# Patient Record
Sex: MALE | Race: WHITE | NOT HISPANIC OR LATINO | Employment: FULL TIME | URBAN - METROPOLITAN AREA
[De-identification: names, ages, dates, MRNs, and addresses within clinical notes are randomized per-mention and may not be internally consistent; named-entity substitution may affect disease eponyms.]

---

## 2018-02-22 ENCOUNTER — OFFICE VISIT (OUTPATIENT)
Dept: ORTHOPEDIC SURGERY | Facility: CLINIC | Age: 28
End: 2018-02-22

## 2018-02-22 VITALS
HEIGHT: 70 IN | WEIGHT: 229.28 LBS | DIASTOLIC BLOOD PRESSURE: 92 MMHG | HEART RATE: 81 BPM | BODY MASS INDEX: 32.82 KG/M2 | SYSTOLIC BLOOD PRESSURE: 168 MMHG
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
## 2018-02-22 DIAGNOSIS — M25.551 RIGHT HIP PAIN: ICD-10-CM
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
## 2018-02-22 DIAGNOSIS — R52 PAIN: Primary | ICD-10-CM

## 2018-02-22 PROCEDURE — 99203 OFFICE O/P NEW LOW 30 MIN: CPT | Performed by: ORTHOPAEDIC SURGERY

## 2018-02-22 RX ORDER — PANTOPRAZOLE SODIUM 40 MG/1
40 TABLET, DELAYED RELEASE ORAL DAILY
COMMUNITY
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
## 2018-03-14 ENCOUNTER — HOSPITAL ENCOUNTER (OUTPATIENT)
Dept: GENERAL RADIOLOGY | Facility: HOSPITAL | Age: 28
Discharge: HOME | End: 2018-03-14
Attending: ORTHOPAEDIC SURGERY | Admitting: ORTHOPAEDIC SURGERY

## 2018-03-14 ENCOUNTER — HOSPITAL ENCOUNTER (OUTPATIENT)
Dept: MRI IMAGING | Facility: HOSPITAL | Age: 28
Discharge: HOME | End: 2018-03-14
Attending: ORTHOPAEDIC SURGERY

## 2018-03-14 DIAGNOSIS — M25.551 RIGHT HIP PAIN: ICD-10-CM

## 2018-03-14 DIAGNOSIS — R52 PAIN: ICD-10-CM
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
## 2018-03-14 PROCEDURE — 0 GADOBENATE DIMEGLUMINE 529 MG/ML SOLUTION: Performed by: ORTHOPAEDIC SURGERY

## 2018-03-14 PROCEDURE — 0 IOPAMIDOL 61 % SOLUTION: Performed by: ORTHOPAEDIC SURGERY

## 2018-03-14 PROCEDURE — 77002 NEEDLE LOCALIZATION BY XRAY: CPT

## 2018-03-14 PROCEDURE — A9577 INJ MULTIHANCE: HCPCS | Performed by: ORTHOPAEDIC SURGERY

## 2018-03-14 PROCEDURE — 73722 MRI JOINT OF LWR EXTR W/DYE: CPT

## 2018-03-14 RX ORDER — LIDOCAINE HYDROCHLORIDE 10 MG/ML
5 INJECTION, SOLUTION INFILTRATION; PERINEURAL ONCE
Status: DISCONTINUED | OUTPATIENT
Start: 2018-03-14 | End: 2018-03-14

## 2018-03-14 RX ADMIN — LIDOCAINE HYDROCHLORIDE 5 ML: 10 INJECTION, SOLUTION INFILTRATION; PERINEURAL at 14:35

## 2018-03-14 RX ADMIN — IOPAMIDOL 10 ML: 612 INJECTION, SOLUTION INTRAVENOUS at 14:40

## 2018-03-14 RX ADMIN — GADOBENATE DIMEGLUMINE 1 ML: 529 INJECTION, SOLUTION INTRAVENOUS at 14:40

## 2018-03-15 ENCOUNTER — OFFICE VISIT (OUTPATIENT)
Dept: ORTHOPEDIC SURGERY | Facility: CLINIC | Age: 28
End: 2018-03-15

## 2018-03-15 VITALS
HEIGHT: 69 IN | WEIGHT: 236.55 LBS | BODY MASS INDEX: 35.04 KG/M2 | DIASTOLIC BLOOD PRESSURE: 100 MMHG | SYSTOLIC BLOOD PRESSURE: 189 MMHG | HEART RATE: 98 BPM

## 2018-03-15 DIAGNOSIS — M25.551 RIGHT HIP PAIN: Primary | ICD-10-CM

## 2018-03-15 DIAGNOSIS — M25.851 FEMOROACETABULAR IMPINGEMENT OF RIGHT HIP: ICD-10-CM
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
## 2018-03-15 PROCEDURE — 99213 OFFICE O/P EST LOW 20 MIN: CPT | Performed by: ORTHOPAEDIC SURGERY

## 2018-03-15 RX ORDER — ANASTROZOLE 1 MG/1
TABLET ORAL
Refills: 0 | COMMUNITY
Start: 2018-03-07

## 2018-03-15 RX ORDER — CYCLOBENZAPRINE HCL 10 MG
10 TABLET ORAL 3 TIMES DAILY PRN
Refills: 0 | COMMUNITY
Start: 2018-03-01

## 2018-03-15 RX ORDER — TESTOSTERONE CYPIONATE 200 MG/ML
INJECTION, SOLUTION INTRAMUSCULAR
Refills: 0 | COMMUNITY
Start: 2018-03-01
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
## 2018-06-05 ENCOUNTER — OFFICE VISIT (OUTPATIENT)
Dept: ORTHOPEDIC SURGERY | Facility: CLINIC | Age: 28
End: 2018-06-05

## 2018-06-05 VITALS — OXYGEN SATURATION: 99 % | BODY MASS INDEX: 33.73 KG/M2 | HEIGHT: 69 IN | WEIGHT: 227.74 LBS | HEART RATE: 87 BPM

## 2018-06-05 DIAGNOSIS — M25.851 FEMOROACETABULAR IMPINGEMENT OF RIGHT HIP: ICD-10-CM

## 2018-06-05 DIAGNOSIS — M25.551 RIGHT HIP PAIN: Primary | ICD-10-CM
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
## 2018-06-05 DIAGNOSIS — M70.61 TROCHANTERIC BURSITIS OF RIGHT HIP: ICD-10-CM
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
## 2018-06-05 PROCEDURE — 99213 OFFICE O/P EST LOW 20 MIN: CPT | Performed by: ORTHOPAEDIC SURGERY

## 2018-06-05 RX ORDER — FLUCONAZOLE 200 MG/1
200 TABLET ORAL DAILY
Refills: 0 | COMMUNITY
Start: 2018-06-01 | End: 2018-12-13

## 2018-06-05 RX ORDER — QUETIAPINE FUMARATE 25 MG/1
TABLET, FILM COATED ORAL
Refills: 0 | COMMUNITY
Start: 2018-06-01
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
## 2018-10-18 ENCOUNTER — OFFICE VISIT (OUTPATIENT)
Dept: ORTHOPEDIC SURGERY | Facility: CLINIC | Age: 28
End: 2018-10-18

## 2018-10-18 VITALS — OXYGEN SATURATION: 98 % | WEIGHT: 233.69 LBS | HEART RATE: 106 BPM | BODY MASS INDEX: 34.61 KG/M2 | HEIGHT: 69 IN

## 2018-10-18 DIAGNOSIS — S73.191D TEAR OF RIGHT ACETABULAR LABRUM, SUBSEQUENT ENCOUNTER: ICD-10-CM
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
## 2018-10-18 DIAGNOSIS — M25.551 RIGHT HIP PAIN: Primary | ICD-10-CM
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
## 2018-10-18 DIAGNOSIS — M25.851 FEMOROACETABULAR IMPINGEMENT OF RIGHT HIP: ICD-10-CM

## 2018-10-18 PROCEDURE — 99213 OFFICE O/P EST LOW 20 MIN: CPT | Performed by: ORTHOPAEDIC SURGERY

## 2018-10-18 RX ORDER — IBUPROFEN 800 MG/1
TABLET ORAL
Refills: 0 | COMMUNITY
Start: 2018-10-02
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
## 2018-11-28 ENCOUNTER — TELEPHONE (OUTPATIENT)
Dept: PAIN MEDICINE | Facility: CLINIC | Age: 28
End: 2018-11-28
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
## 2018-12-13 ENCOUNTER — OFFICE VISIT (OUTPATIENT)
Dept: PAIN MEDICINE | Facility: CLINIC | Age: 28
End: 2018-12-13

## 2018-12-13 VITALS
BODY MASS INDEX: 36.7 KG/M2 | OXYGEN SATURATION: 99 % | WEIGHT: 247.8 LBS | TEMPERATURE: 98.2 F | DIASTOLIC BLOOD PRESSURE: 82 MMHG | RESPIRATION RATE: 18 BRPM | HEIGHT: 69 IN | SYSTOLIC BLOOD PRESSURE: 160 MMHG | HEART RATE: 98 BPM

## 2018-12-13 DIAGNOSIS — M53.3 SACROILIAC JOINT DYSFUNCTION OF RIGHT SIDE: ICD-10-CM

## 2018-12-13 DIAGNOSIS — Z98.890 HISTORY OF LUMBAR DISCECTOMY: ICD-10-CM

## 2018-12-13 DIAGNOSIS — M70.61 GREATER TROCHANTERIC BURSITIS OF RIGHT HIP: ICD-10-CM

## 2018-12-13 DIAGNOSIS — S73.191A TEAR OF RIGHT ACETABULAR LABRUM, INITIAL ENCOUNTER: ICD-10-CM

## 2018-12-13 DIAGNOSIS — M25.851 FEMOROACETABULAR IMPINGEMENT OF RIGHT HIP: ICD-10-CM

## 2018-12-13 DIAGNOSIS — S73.191A TEAR OF RIGHT ACETABULAR LABRUM, INITIAL ENCOUNTER: Primary | ICD-10-CM

## 2018-12-13 PROCEDURE — 99204 OFFICE O/P NEW MOD 45 MIN: CPT | Performed by: ANESTHESIOLOGY

## 2018-12-13 RX ORDER — ME-TETRAHYDROFOLATE/B12/HRB236 1-1-500 MG
CAPSULE ORAL
Qty: 180 CAPSULE | Refills: 1 | Status: SHIPPED | OUTPATIENT
Start: 2018-12-13

## 2019-01-09 ENCOUNTER — OUTSIDE FACILITY SERVICE (OUTPATIENT)
Dept: PAIN MEDICINE | Facility: CLINIC | Age: 29
End: 2019-01-09

## 2019-01-09 DIAGNOSIS — Z98.890 HISTORY OF LUMBAR DISCECTOMY: ICD-10-CM

## 2019-01-09 DIAGNOSIS — S73.191A TEAR OF RIGHT ACETABULAR LABRUM, INITIAL ENCOUNTER: Primary | ICD-10-CM

## 2019-01-09 DIAGNOSIS — M70.61 GREATER TROCHANTERIC BURSITIS OF RIGHT HIP: ICD-10-CM

## 2019-01-09 DIAGNOSIS — M53.3 SACROILIAC JOINT DYSFUNCTION OF RIGHT SIDE: ICD-10-CM

## 2019-01-09 PROCEDURE — 77002 NEEDLE LOCALIZATION BY XRAY: CPT | Performed by: ANESTHESIOLOGY

## 2019-01-09 PROCEDURE — 99152 MOD SED SAME PHYS/QHP 5/>YRS: CPT | Performed by: ANESTHESIOLOGY

## 2019-01-09 PROCEDURE — 20610 DRAIN/INJ JOINT/BURSA W/O US: CPT | Performed by: ANESTHESIOLOGY

## 2019-01-10 ENCOUNTER — TELEPHONE (OUTPATIENT)
Dept: PAIN MEDICINE | Facility: CLINIC | Age: 29
End: 2019-01-10

## 2019-01-10 NOTE — TELEPHONE ENCOUNTER
Patient had dx/tx right hip joint injection on 01/09/19. Patient reports that he is doing well. He does have some soreness and has been using ice every 2 hours

## 2019-01-24 ENCOUNTER — OFFICE VISIT (OUTPATIENT)
Dept: ORTHOPEDIC SURGERY | Facility: CLINIC | Age: 29
End: 2019-01-24

## 2019-01-24 VITALS — HEART RATE: 104 BPM | WEIGHT: 241.4 LBS | OXYGEN SATURATION: 99 % | HEIGHT: 69 IN | BODY MASS INDEX: 35.76 KG/M2

## 2019-01-24 DIAGNOSIS — M25.851 FEMOROACETABULAR IMPINGEMENT OF RIGHT HIP: ICD-10-CM
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
## 2019-01-24 DIAGNOSIS — M25.551 RIGHT HIP PAIN: Primary | ICD-10-CM

## 2019-01-24 PROCEDURE — 99213 OFFICE O/P EST LOW 20 MIN: CPT | Performed by: ORTHOPAEDIC SURGERY

## 2019-01-24 NOTE — PROGRESS NOTES
"    [facility] Note    Subjective     CC: Follow-up of the Left Hip (3 months)      EMILIANO Augustine is a 28 y.o. male.  Patient returns today after an intra-articular injection was given into the right hip by Dr. Carey.  He says this helped maybe 50%.  He still having the same type symptoms.       ROS:    Constiutional:Pt denies fever, chills, nausea, or vomiting.  MSK:as above    Objective      Past Medical History  Past Medical History:   Diagnosis Date   • Low back pain          Physical Exam  Pulse 104   Ht 175.3 cm (69.02\")   Wt 110 kg (241 lb 6.5 oz)   SpO2 99%   BMI 35.63 kg/m²     Body mass index is 35.63 kg/m².    Patient is well nourished and well developed.        Ortho Exam  Internal rotation 20° external rotation 30°  Positive Stinchfield  Positive adduction plus internal rotation  Negative external rotation plus extension    Assessment:  1. Right hip pain    2. Work related injury    3. Femoroacetabular impingement of right hip        Plan:  1. Recommend over the counter anti-inflammatories for pain and/or swelling  2. Patient will be sent to Dr. Rudy Ny for consideration of arthroscopic surgery to the right hip.  This work-related injury and the patient is a  but also a heavy weightlifter.  At this point, patient has exhausted nonoperative treatment including therapy and an injection.  He has had an MR arthrogram of the hip.  We will ask him to bring that to his appointment to see Dr. Ny.  Follow-up with me when necessary            Miguel Baig MD  01/24/19  5:24 PM  "